# Patient Record
Sex: MALE | Race: OTHER | NOT HISPANIC OR LATINO
[De-identification: names, ages, dates, MRNs, and addresses within clinical notes are randomized per-mention and may not be internally consistent; named-entity substitution may affect disease eponyms.]

---

## 2021-10-01 ENCOUNTER — TRANSCRIPTION ENCOUNTER (OUTPATIENT)
Age: 21
End: 2021-10-01

## 2024-10-15 ENCOUNTER — EMERGENCY (EMERGENCY)
Facility: HOSPITAL | Age: 24
LOS: 0 days | Discharge: HOME | End: 2024-10-15
Attending: STUDENT IN AN ORGANIZED HEALTH CARE EDUCATION/TRAINING PROGRAM
Payer: COMMERCIAL

## 2024-10-15 VITALS
DIASTOLIC BLOOD PRESSURE: 87 MMHG | SYSTOLIC BLOOD PRESSURE: 139 MMHG | OXYGEN SATURATION: 99 % | WEIGHT: 195.11 LBS | RESPIRATION RATE: 18 BRPM | TEMPERATURE: 98 F | HEART RATE: 60 BPM

## 2024-10-15 DIAGNOSIS — N43.3 HYDROCELE, UNSPECIFIED: ICD-10-CM

## 2024-10-15 DIAGNOSIS — N50.811 RIGHT TESTICULAR PAIN: ICD-10-CM

## 2024-10-15 LAB
APPEARANCE UR: CLEAR — SIGNIFICANT CHANGE UP
BILIRUB UR-MCNC: NEGATIVE — SIGNIFICANT CHANGE UP
COLOR SPEC: YELLOW — SIGNIFICANT CHANGE UP
DIFF PNL FLD: NEGATIVE — SIGNIFICANT CHANGE UP
GLUCOSE UR QL: NEGATIVE MG/DL — SIGNIFICANT CHANGE UP
KETONES UR-MCNC: NEGATIVE MG/DL — SIGNIFICANT CHANGE UP
LEUKOCYTE ESTERASE UR-ACNC: NEGATIVE — SIGNIFICANT CHANGE UP
NITRITE UR-MCNC: NEGATIVE — SIGNIFICANT CHANGE UP
PH UR: 7 — SIGNIFICANT CHANGE UP (ref 5–8)
PROT UR-MCNC: NEGATIVE MG/DL — SIGNIFICANT CHANGE UP
SP GR SPEC: 1.02 — SIGNIFICANT CHANGE UP (ref 1–1.03)
UROBILINOGEN FLD QL: 0.2 MG/DL — SIGNIFICANT CHANGE UP (ref 0.2–1)

## 2024-10-15 PROCEDURE — 87591 N.GONORRHOEAE DNA AMP PROB: CPT

## 2024-10-15 PROCEDURE — 76870 US EXAM SCROTUM: CPT

## 2024-10-15 PROCEDURE — 81003 URINALYSIS AUTO W/O SCOPE: CPT

## 2024-10-15 PROCEDURE — 76870 US EXAM SCROTUM: CPT | Mod: 26

## 2024-10-15 PROCEDURE — 99284 EMERGENCY DEPT VISIT MOD MDM: CPT

## 2024-10-15 PROCEDURE — 87491 CHLMYD TRACH DNA AMP PROBE: CPT

## 2024-10-15 PROCEDURE — 99284 EMERGENCY DEPT VISIT MOD MDM: CPT | Mod: 25

## 2024-10-15 NOTE — ED PROVIDER NOTE - PATIENT PORTAL LINK FT
You can access the FollowMyHealth Patient Portal offered by [facility] by registering at the following website: [email or web address]. By joining [employer]’s FollowMyHealth portal, you will also be able to view your health information using other applications (apps) compatible with our system.

## 2024-10-15 NOTE — ED PROVIDER NOTE - PHYSICAL EXAMINATION
CONSTITUTIONAL: NAD  SKIN: Warm dry  HEAD: NCAT  EYES: NL inspection  ENT: MMM  NECK: Supple; non tender.  CARD: RRR  RESP: CTAB  ABD: S/NT no R/G  BACK: nontender  EXT: no pedal edema  NEURO: Grossly unremarkable  PSYCH: Cooperative, appropriate.  : Chaperone Dr. Jean  Nml male genitialia. nml testicular lie symmetrical in size. no massess palpated. no acute rash no discharge from urethra. + ttp to right inferior  testicle  nml cremasteric reflex b/l

## 2024-10-15 NOTE — ED PROVIDER NOTE - OBJECTIVE STATEMENT
Pt is a 24 y.o Male with no significant PMHx who presents to the ED with chief complaint of right testicular pain since yesterday. Pt states that he felt sharp pain in right testicle radiating to his back and noticed right testicle shorter than left testicle. Pt states he may have accidently hit into his right testicle a couple of days ago when he attempted to sit down. Denies any hx of STDs. Denies any fever chills, trauma, abdominal pain, n,v,d, Denies any dysuria hematuria, penile discharge. Pt states he does lift weights for fitness but nothing out of the ordinary. Denies any acute rash or lymphadenopathy. Pt is sexually active.

## 2024-10-15 NOTE — ED PROVIDER NOTE - CLINICAL SUMMARY MEDICAL DECISION MAKING FREE TEXT BOX
24 y.o Male with no significant PMHx who presents to the ED with chief complaint of right testicular pain since yesterday. Pt states that he felt sharp pain in right testicle radiating to his back and noticed right testicle shorter than left testicle. Pt states he may have accidently hit into his right testicle a couple of days ago when he attempted to sit down. denies fevers, n/v/d. denies rashes. denies penile discharge. on exam, comfortable appearing. Testicular exam as documented, no lesions. Abdomen nontender. no cva tenderness on exam. Urinalysis unremarkable. std testing sent. US with hydroceles otherwise no concerning acute pathology. discussed very strict return precautions including signs of testicular torsion with him and his mother- both expressed understanding and are in agreement. follow up with urology.

## 2024-10-15 NOTE — ED PROVIDER NOTE - NSFOLLOWUPINSTRUCTIONS_ED_ALL_ED_FT
Our Emergency Department Referral Coordinators will be reaching out to you in the next 24-48 hours from 9:00am to 5:00pm to schedule a follow up appointment. Please expect a phone call from the hospital in that time frame. If you do not receive a call or if you have any questions or concerns, you can reach them at   [phone].  Hydrocele, Adult  A testicle, showing a buildup of fluid in the scrotum.  A hydrocele is a collection of fluid in the loose pouch of skin that holds the testicles (scrotum). It can occur in one or both testicles. This may happen because:  The amount of fluid produced in the scrotum is not absorbed by the rest of the body.  Fluid from the abdomen fills the scrotum. Normally, the testicles develop in the abdomen and then drop into the scrotum before birth. The tube that the testicles travel through usually closes after the testicles drop. If the tube does not close, fluid from the abdomen can fill the scrotum. This is not very common in adults.  What are the causes?  A hydrocele may be caused by:  An injury to the scrotum.  An infection.  Decreased blood flow to the scrotum.  Twisting of a testicle (testicular torsion).  A birth defect.  A tumor or cancer of the testicle.  Sometimes, the cause is not known.    What are the signs or symptoms?  A hydrocele feels like a water-filled balloon. It may also feel heavy. Other symptoms include:  Swelling of the scrotum. The swelling may decrease when you lie down. You may also notice more swelling at night than in the morning. This is called a communicating hydrocele, in which the fluid in the scrotum goes back into the abdominal cavity when the position of the scrotum changes.  Swelling of the groin.  Mild discomfort in the scrotum.  Pain. This can develop if the hydrocele was caused by infection or twisting. The larger the hydrocele, the more likely you are to have pain. Swelling may also cause pain.  How is this diagnosed?  This condition may be diagnosed based on a physical exam and your medical history. You may also have tests, including:  Imaging tests, such as an ultrasound.  A transillumination test. This test takes place in a dark room where a light is placed on the skin of the scrotum. Clear liquid will not impede the light and the scrotum will be illuminated. This helps a health care provider distinguish a hydrocele from a tumor.  Blood or urine tests.  How is this treated?  Most hydroceles go away on their own. If you have no discomfort or pain, your health care provider may suggest close monitoring of your condition until the condition goes away or symptoms develop. This is called watch and wait or watchful waiting. If treatment is needed, it may include:  Treating an underlying condition. This may include taking an antibiotic medicine to treat an infection.  Having surgery to stop fluid from collecting in the scrotum.  Having surgery to drain the fluid. Surgery may include:  Hydrocelectomy. For this procedure, an incision is made in the scrotum to remove the fluid.  Needle aspiration. A needle is used to drain fluid. However, the fluid buildup will come back quickly and may lead to an infection of the scrotum. This is rarely done.  Follow these instructions at home:  Medicines    Take over-the-counter and prescription medicines only as told by your health care provider.  If you were prescribed an antibiotic medicine, take it as told by your health care provider. Do not stop taking the antibiotic even if you start to feel better.  General instructions    Watch the hydrocele for any changes.  Keep all follow-up visits. This is important.  Contact a health care provider if:  You notice any changes in the hydrocele.  The swelling in your scrotum or groin gets worse.  The hydrocele becomes red, firm, painful, or tender to the touch.  You have a fever.  Get help right away if you:  Develop a lot of pain or your pain becomes worse.  Have chills.  Have a high fever.  Summary  A hydrocele is a collection of fluid in the loose pouch of skin that holds the testicles (scrotum).  A hydrocele can cause swelling, discomfort, and pain.  In adults, the cause of a hydrocele may not be known. However, it is sometimes caused by an infection or the twisting of a testicle.  Hydroceles often go away on their own. If a hydrocele causes pain, treating the underlying cause may be needed to ease the pain.  This information is not intended to replace advice given to you by your health care provider. Make sure you discuss any questions you have with your health care provider.

## 2024-10-16 LAB
C TRACH RRNA SPEC QL NAA+PROBE: SIGNIFICANT CHANGE UP
N GONORRHOEA RRNA SPEC QL NAA+PROBE: SIGNIFICANT CHANGE UP
SPECIMEN SOURCE: SIGNIFICANT CHANGE UP